# Patient Record
(demographics unavailable — no encounter records)

---

## 2020-06-18 NOTE — RAD
EXAM:  XR Finger(s) Rt Min 2 View



DATE:  6/18/2020 4:07 PM



INDICATION:  Fall with right finger injury



COMPARISON:  None.



FINDING:  There is a mildly displaced, obliquely oriented fracture involving the small finger proxima
l phalangeal base. There is apex dorsal angulation. The distal fracture fragment is displaced

ulnarly one cortex width. There is likely fracture extension into the articular surface of the small 
finger proximal phalangeal base that is nondisplaced. There is osteoarthritic change involving the

IP joints of the right small finger.





IMPRESSION:Mildly displaced, mild dorsally angulated small finger proximal phalangeal base fracture



Reported By: Renny Doyle 

Electronically Signed:  6/18/2020 4:14 PM

## 2020-06-25 NOTE — RAD
LUMBAR SPINE:

6/25/20

 

Four views.

 

HISTORY: 

Back pain. Stenosis. 

 

Comparison made to lumbar films of 10/22/19. 

 

Facet screws seen posteriorly at L5 and S1 on the prior study are no longer present. Moderate hypertr
ophic degenerative spurring is again noted at all levels. Loss of disc space seen at all levels. Prom
inent disc narrowing at L5-S1 again noted. Prominent facet hypertrophy is present. There is anterolis
thesis at L3-4 which has occurred since the prior study. 

 

There are posterior laminectomy changes at L3, L4 and L5. 

 

IMPRESSION: 

Moderate to severe degenerative changes. Very prominent facet hypertrophy. Anterolisthesis at L3-4 ha
s occurred since the prior study. 

 

POS: [facility]

## 2020-10-31 NOTE — RAD
PORTABLE CHEST: 

10/31/20

 

PROVIDED CLINICAL HISTORY:  

Headache.

 

FINDINGS: 

No comparisons. Cardiac silhouette is within normal limits. Median sternotomy changes and vascular ca
lcifications are seen. Advanced right glenohumeral degenerative change. No focal consolidation, pleur
al fluid, or pneumothorax apparent. 

 

IMPRESSION: 

No evidence for an acute cardiopulmonary process. 

 

POS: [facility]

## 2020-10-31 NOTE — CT
CT BRAIN:

10/31/20

 

PROVIDED CLINICAL HISTORY:  

Headache. 

 

FINDINGS: 

No comparison. 

 

The ventricular system is prominent on the basis of central atrophy. Conspicuous chronic microvascula
r ischemic changes are seen involving cerebral white matter. Vascular calcifications are noted. There
 is no evidence for intracranial hemorrhage or mass effect. The osseous structures and extracranial s
oft tissues demonstrate no acute abnormality.

 

IMPRESSION: 

No evidence for intracranial hemorrhage or mass effect. 

 

POS: [facility]